# Patient Record
Sex: FEMALE | NOT HISPANIC OR LATINO | URBAN - NONMETROPOLITAN AREA
[De-identification: names, ages, dates, MRNs, and addresses within clinical notes are randomized per-mention and may not be internally consistent; named-entity substitution may affect disease eponyms.]

---

## 2023-06-29 ENCOUNTER — HOME HEALTH ADMISSION (OUTPATIENT)
Dept: HOME HEALTH SERVICES | Facility: HOME HEALTHCARE | Age: 88
End: 2023-06-29
Payer: MEDICARE

## 2023-07-27 ENCOUNTER — HOME CARE VISIT (OUTPATIENT)
Dept: HOME HEALTH SERVICES | Facility: CLINIC | Age: 88
End: 2023-07-27
Payer: MEDICARE

## 2023-07-27 VITALS
SYSTOLIC BLOOD PRESSURE: 126 MMHG | HEART RATE: 64 BPM | TEMPERATURE: 98.5 F | OXYGEN SATURATION: 98 % | DIASTOLIC BLOOD PRESSURE: 60 MMHG | RESPIRATION RATE: 19 BRPM

## 2023-07-27 PROCEDURE — G0157 HHC PT ASSISTANT EA 15: HCPCS

## 2023-07-27 NOTE — HOME HEALTH
pt up amb. in home mod Ind. in home w/ [facility] and reports decreased RW use in home; pt reports has appointment w/ Ortho MD on 8/2/23 and blood draw w/ PCP on 8/3/23 in am; pt reports was able to drive self to [facility] on Sunday w/ only trace/min difficulty w/ transfers in/out home/car/[facility]

## 2023-08-03 ENCOUNTER — HOME CARE VISIT (OUTPATIENT)
Dept: HOME HEALTH SERVICES | Facility: CLINIC | Age: 88
End: 2023-08-03
Payer: MEDICARE

## 2023-08-09 ENCOUNTER — HOME CARE VISIT (OUTPATIENT)
Dept: HOME HEALTH SERVICES | Facility: CLINIC | Age: 88
End: 2023-08-09
Payer: MEDICARE

## 2023-08-09 VITALS
HEART RATE: 72 BPM | DIASTOLIC BLOOD PRESSURE: 60 MMHG | SYSTOLIC BLOOD PRESSURE: 134 MMHG | OXYGEN SATURATION: 97 % | TEMPERATURE: 98.7 F | RESPIRATION RATE: 19 BRPM

## 2023-08-09 PROCEDURE — G0157 HHC PT ASSISTANT EA 15: HCPCS

## 2023-08-09 NOTE — HOME HEALTH
pt up amb. in home w/ RW and reports resumed RW use due to chronic L LE soreness; pt reports increased activities outside home but did sit a lot yesterday

## 2023-08-09 NOTE — Clinical Note
pt ed on scheduled D/C from  PT services next week, and medicare 2 day notice signed; pts copy left in pts folder    pt scheduled/ready for D/C [place] by Physical Therapist next visit, per pts plan of care

## 2023-08-16 ENCOUNTER — HOME CARE VISIT (OUTPATIENT)
Dept: HOME HEALTH SERVICES | Facility: CLINIC | Age: 88
End: 2023-08-16
Payer: MEDICARE

## 2023-08-16 PROCEDURE — G0151 HHCP-SERV OF PT,EA 15 MIN: HCPCS

## 2023-08-16 NOTE — Clinical Note
PLEASE ROUTE TO REY[facility] NEWMAN    PATIENT WAS REFERRED TO HOME HEALTH SERVICES FOR RIGHT HIP FRACTURE WITH ORIF. PATIENT HAS RECEIVED SKILLED PT SERVICES FOR STRENGTH, BALANCE, GAIT, TRANSFERS, SAFETY, EDUCAITON. PATIENT HAS MET HER GOALS AND IS DOING REALLY WELL AT THIS TIME    PRIOR VS CURRENT:   GAIT WITH WALKER AT EVALUATION, GAIT MOD I WITH CANE IN AND OUT OF HOME  MMT 4- EVAL QUAD [facility] SLR THEN 5/5 DISCHARGE  TRANSFER [facility]BA EVAL, MOD I DISCHARGE    DISCHARGE TO SELF CARE  DISCHARGE CONDITION GOOD  DISCHARGE REASON GOALS MET

## 2023-08-17 VITALS
SYSTOLIC BLOOD PRESSURE: 126 MMHG | RESPIRATION RATE: 18 BRPM | HEART RATE: 68 BPM | TEMPERATURE: 97.1 F | OXYGEN SATURATION: 97 % | DIASTOLIC BLOOD PRESSURE: 66 MMHG

## 2023-08-17 NOTE — HOME HEALTH
Patient is ready for discharge she is doing well at this time. She is back to walking with her cane and is doing well with her hip.